# Patient Record
Sex: MALE | Race: WHITE | URBAN - METROPOLITAN AREA
[De-identification: names, ages, dates, MRNs, and addresses within clinical notes are randomized per-mention and may not be internally consistent; named-entity substitution may affect disease eponyms.]

---

## 2017-01-18 ENCOUNTER — OFFICE VISIT (OUTPATIENT)
Dept: SURGERY | Facility: CLINIC | Age: 37
End: 2017-01-18

## 2017-01-18 VITALS
SYSTOLIC BLOOD PRESSURE: 116 MMHG | TEMPERATURE: 98 F | BODY MASS INDEX: 26.9 KG/M2 | DIASTOLIC BLOOD PRESSURE: 76 MMHG | HEART RATE: 63 BPM | WEIGHT: 203 LBS | HEIGHT: 73 IN

## 2017-01-18 DIAGNOSIS — R35.1 NOCTURIA: ICD-10-CM

## 2017-01-18 DIAGNOSIS — Z30.09 VASECTOMY EVALUATION: Primary | ICD-10-CM

## 2017-01-18 PROCEDURE — 99213 OFFICE O/P EST LOW 20 MIN: CPT | Performed by: UROLOGY

## 2017-01-18 PROCEDURE — 99244 OFF/OP CNSLTJ NEW/EST MOD 40: CPT | Performed by: UROLOGY

## 2017-01-18 RX ORDER — HYDROCODONE BITARTRATE AND ACETAMINOPHEN 5; 325 MG/1; MG/1
TABLET ORAL
Qty: 20 TABLET | Refills: 0 | Status: SHIPPED | OUTPATIENT
Start: 2017-01-18

## 2017-01-18 RX ORDER — DIAZEPAM 5 MG/1
TABLET ORAL
Qty: 3 TABLET | Refills: 0 | Status: SHIPPED | OUTPATIENT
Start: 2017-01-18

## 2017-01-18 RX ORDER — CEFADROXIL 500 MG/1
CAPSULE ORAL
Qty: 10 CAPSULE | Refills: 0 | Status: SHIPPED | OUTPATIENT
Start: 2017-01-18

## 2017-01-18 NOTE — PROGRESS NOTES
Salvador Beckham is a 39year old male. HPI:     History provided by patient. [address]               Patient is 39 and his wife is 29. They have 4 children (girl 15, boy 15, boy 6 girl 7 weeks old).   This is the  only marriage fo UROLOGICAL HISTORY---  denies history of urolithiasis or urological infections or urological cancers or any urological operations or procedures. HISTORY:  History reviewed. No pertinent past medical history. History reviewed.  No pertinent past surgic normocephalic  Eyes/Vision: no scleral icterus  Neck/Thyroid: neck is supple without adenopathy  Lymphatic: no abnormal cervical, supraclavicular or inguinal  adenopathy is noted  Respiratory: normal to inspection lungs are clear to auscultation bilaterall argued in the past that there was an association with prostate cancer might make this a controversial issue.  The patient understands that the Professor Shantel Ayala studied this entire issue and noted that there is not enough data to support changing vasectomy and that this pain may last for a year or many years in some  patients. If it does occur, treating the pain so that it would go away completely might be very difficult and may not be successful.   I explained to him and he understands that compli wishes to proceed. In the meantime, I strongly recommend him that he continue to use birth control precautions  at this point, and  also after the bilateral vasectomy until we tell him that it is safe to stop doing so. He understands and agrees.   I advise ; he did not want to go under general anesthesia. RECOMMENDATIONS AND TREATMENT PLAN      1. Please continue  birth control precautions without exception, every time you have intercourse,  until further notice    2.   Proceed with plans for volun Visit:  No orders of the defined types were placed in this encounter. Meds This Visit:    No prescriptions requested or ordered in this encounter    Imaging & Referrals:  None     Attention:  This note has been scribed by Joyce Lazcano(scribe) under

## (undated) NOTE — MR AVS SNAPSHOT
[place]  [address]  [phone]               Thank you for choosing us for your health care visit with Dmitri Nuñez MD.  We are glad to serve you and happy to provide you with this summ be putting you to sleep, because otherwise the medications may cause nausea. 9.  no heavy lifting or strenuous physical activity for 7 days after the procedure    10.   On the morning of the procedure upon awakening, please shave your scrotum as I showed office, you can view your past visit information in [facility] by going to Visits < Visit Summaries. [facility] questions? Call [phone] for help. [facility] is NOT to be used for urgent needs. For medical emergencies, dial 911.         Educational Inform

## (undated) NOTE — Clinical Note
January 19, 2017         Cas Ovalle MD  [address] [facility] [id number]      Patient: Azucena Fox   YOB: 1980   Date of Visit: 1/18/2017       Dear Dr. Bettey Severance, MD,    Many thanks for referring Azucena Fox to my prac procedure.  I also advised 2 reusable cold gel packs or 3 bags of frozen peas on the day after the procedure.   I advised that he avoid exercise for 1 week after the procedure.  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  NO aspirin or NSAIDs (medications such as Advil, Motrin, Aleve, ibuprofen) for 10 days before the procedure and none for the first 2 days after the procedure.   It is okay to take Tylenol or acetaminophen, but not other painkillers,   before the procedu

## (undated) NOTE — Clinical Note
No referring provider defined for this encounter. 01/18/2017        Patient: Estelita Mcdowell   YOB: 1980   Date of Visit: 1/18/2017       Dear  Dr. Mao Mariscla MD,      Many thanks for referring Estelita Mcdowell to my practice.   Mattie for 1 week after the procedure.  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  Pre-procedure instructions were discussed in detail with the patient and ar after the procedure. It is okay to take Tylenol or acetaminophen, but not other painkillers,   before the procedure    4. Antibiotic   CEFADROXIL 500 mg one tablet by mouth twice a day for 5 days starting ON THE DAY BEFORE the procedure    5.   Diazepam 5